# Patient Record
(demographics unavailable — no encounter records)

---

## 2024-12-03 NOTE — HISTORY OF PRESENT ILLNESS
[Previously active] : previously active [Men] : men [No] : No [FreeTextEntry1] : 12/03/2024. NAYLA MUSTAFA 22 year old female G0 , no menses while on Junel. She presents for an annual gyn exam.   She reports no menses with continuous use of Junel. She reports intermittent breakthrough spotting and pelvic cramping on OCP over the last geek. Last pelvic sono in 06/23 was normal. She used to take the placebo pills in the past but reports menses were very painful.   She denies abdominal and pelvic pain, no vaginal discharge or vaginitis symptoms. She reports normal urination, no dysuria, no incontinence of urine. BM is normal per patient, no blood in stool or constipation/diarrhea.  No new medical conditions, medications or surgeries.   Obhx: G0 GYNhx: Denies history of fibroids, abnl pap, STI, pelvic infection, breast issues PMH: anxiety PSH: appendectomy, wisdom teeth Med: Wellbutrin, Junel 1/20, Trazadone PRN All: Cefzil, Amoxicillin Famhx: PGM- breast ca, father- melanoma. Denies FHx of ovarian, uterine, colon, pancreatic, or prostate cancer. Soc Hx: rare alcohol, previous vape use, no drug or tobacco use, nonsmoker. S/p Gardasil. PHQ9 = 1  [TextBox_4] : TVUS 06/23- possible arcuate uterus, normal ovaries [PapSmeardate] : 06/23 [TextBox_31] : JL [GonorrheaDate] : 11/24 [TextBox_63] : not detected [ChlamydiaDate] : 11/24 [TextBox_68] : not detected

## 2024-12-03 NOTE — PLAN
[FreeTextEntry1] : Routine Gyn Exam: BSA, calcium, vitamin D and exercise d/w pt Pap smear conducted w/ reflex to HPV - cervix is friable Advised pt to see PCP and dermatologist annually Declines STI bloods  BTB on OCPs: Likely 2/2 continuous use Advised to have a bleed.  Advised pt take placebo pills Q 3-4 months to initiate a bleed Rx refilled for Junel 1/20  Dysmenorrhea: Discussed Naproxen prior to menses onset, r/b/a reviewed. Rx sent  Yellow Vaginal Discharge: Vaginitis panel sent  RTO in 1 year or PRN

## 2024-12-03 NOTE — PHYSICAL EXAM
[Chaperone Present] : A chaperone was present in the examining room during all aspects of the physical examination [60017] : A chaperone was present during the pelvic exam. [Appropriately responsive] : appropriately responsive [Alert] : alert [No Acute Distress] : no acute distress [No Lymphadenopathy] : no lymphadenopathy [Regular Rate Rhythm] : regular rate rhythm [No Murmurs] : no murmurs [Clear to Auscultation B/L] : clear to auscultation bilaterally [Soft] : soft [Non-tender] : non-tender [Non-distended] : non-distended [No HSM] : No HSM [No Lesions] : no lesions [No Mass] : no mass [Oriented x3] : oriented x3 [Examination Of The Breasts] : a normal appearance [No Masses] : no breast masses were palpable [Labia Majora] : normal [Labia Minora] : normal [Discharge] : a  ~M vaginal discharge was present [Scant] : scant [Uma] : yellow [Normal] : normal [Uterine Adnexae] : normal [FreeTextEntry2] : [facility]) [FreeTextEntry5] : cervix bleeds easily on pap

## 2024-12-10 NOTE — HISTORY OF PRESENT ILLNESS
[FreeTextEntry1] : 23 yo female on Junel 1/20 presents with heavy vaginal bleeding while mid- pack. Previously seen 1 week ago with though that BTB was due to skipping placebo pills. Plan was to induce a bleed by taking the placebo pills to allow for a full menses then resume pack as usual.   Pt also given option to increase dose to Junel 1.5/30 but pt declines due to concerns for weight gain, acne, and "being sensitive to hormones"  pt reports that she is not sexuall active

## 2024-12-10 NOTE — PLAN
[FreeTextEntry1] : 21 yo with break-through bleeding on OCPs - given option to increase dose to 1.5/30 vs a taper dose of current Junel 1/20 - pt opts for taper dose - plan 3 pills x 1 day, 2pills x 1 day, then resume 1 pill daily- skip placebo pills and start next pack and then take the placebo pills with the following pack - pt counselled that if this recurs, she just may require a higher dose of OCPs - will monitor  CONSTANTIN Mcdowell

## 2024-12-10 NOTE — PHYSICAL EXAM
[Labia Majora] : normal [Labia Minora] : normal [Normal] : normal [Uterine Adnexae] : normal [FreeTextEntry4] : moderate amt dark red blood in vault. no cervical lesions noted

## 2025-01-10 NOTE — ASSESSMENT
[FreeTextEntry1] : [facility] Labs reviewed with pt today Gyn utd Tdap utd 2021 Endocrine eval recommended Support offered f/u prn or 1 year

## 2025-01-10 NOTE — HEALTH RISK ASSESSMENT
[Good] : ~his/her~  mood as  good [Yes] : Yes [2 - 4 times a month (2 pts)] : 2-4 times a month (2 points) [No] : In the past 12 months have you used drugs other than those required for medical reasons? No [No falls in past year] : Patient reported no falls in the past year [Never] : Never [None] : None [With Family] : lives with family [College] : College [Single] : single [Feels Safe at Home] : Feels safe at home [Fully functional (bathing, dressing, toileting, transferring, walking, feeding)] : Fully functional (bathing, dressing, toileting, transferring, walking, feeding) [Fully functional (using the telephone, shopping, preparing meals, housekeeping, doing laundry, using] : Fully functional and needs no help or supervision to perform IADLs (using the telephone, shopping, preparing meals, housekeeping, doing laundry, using transportation, managing medications and managing finances) [Smoke Detector] : smoke detector [Carbon Monoxide Detector] : carbon monoxide detector [Seat Belt] :  uses seat belt [de-identified] : active [de-identified] : regular [Reports changes in hearing] : Reports no changes in hearing [Reports changes in vision] : Reports no changes in vision [Reports changes in dental health] : Reports no changes in dental health

## 2025-01-10 NOTE — HISTORY OF PRESENT ILLNESS
[FreeTextEntry1] : Annual Physical [de-identified] : NAYLA MUSTAFA is a 21 yo woman with anxiety here for a physical. She has been well overall.  Gyn utd. Tdap utd 2021.  Seeing psychiatrist for anxiety, stable on current med.  Some concern for PCOS, irregular periods, weight gain, advised endocrine eval.  The patient is single. She graduated from [employer], majoring in [employer]. She would have no difficulty walking 4 to 6 blocks.

## 2025-01-10 NOTE — REVIEW OF SYSTEMS
[Fever] : no fever [Vision Problems] : no vision problems [Nasal Discharge] : no nasal discharge [Chest Pain] : no chest pain [Abdominal Pain] : no abdominal pain

## 2025-01-10 NOTE — PHYSICAL EXAM
[No Acute Distress] : no acute distress [Well Nourished] : well nourished [Well Developed] : well developed [Well-Appearing] : well-appearing [Normal Sclera/Conjunctiva] : normal sclera/conjunctiva [EOMI] : extraocular movements intact [Normal Outer Ear/Nose] : the outer ears and nose were normal in appearance [Normal Oropharynx] : the oropharynx was normal [Normal TMs] : both tympanic membranes were normal [Normal Nasal Mucosa] : the nasal mucosa was normal [No Lymphadenopathy] : no lymphadenopathy [Supple] : supple [Thyroid Normal, No Nodules] : the thyroid was normal and there were no nodules present [No Respiratory Distress] : no respiratory distress  [No Accessory Muscle Use] : no accessory muscle use [Clear to Auscultation] : lungs were clear to auscultation bilaterally [Normal Percussion] : the chest was normal to percussion [Normal Rate] : normal rate  [Regular Rhythm] : with a regular rhythm [Normal S1, S2] : normal S1 and S2 [No Murmur] : no murmur heard [No Edema] : there was no peripheral edema [Soft] : abdomen soft [Non Tender] : non-tender [Non-distended] : non-distended [Normal Bowel Sounds] : normal bowel sounds [Normal Supraclavicular Nodes] : no supraclavicular lymphadenopathy [Normal Posterior Cervical Nodes] : no posterior cervical lymphadenopathy [Normal Anterior Cervical Nodes] : no anterior cervical lymphadenopathy [No CVA Tenderness] : no CVA  tenderness [Coordination Grossly Intact] : coordination grossly intact [No Focal Deficits] : no focal deficits [Normal Gait] : normal gait [Deep Tendon Reflexes (DTR)] : deep tendon reflexes were 2+ and symmetric [Speech Grossly Normal] : speech grossly normal [Memory Grossly Normal] : memory grossly normal [Normal Affect] : the affect was normal [Alert and Oriented x3] : oriented to person, place, and time [Normal Mood] : the mood was normal [Normal Insight/Judgement] : insight and judgment were intact [Normal Appearance] : normal in appearance [No Masses] : no palpable masses [No Nipple Discharge] : no nipple discharge [No Axillary Lymphadenopathy] : no axillary lymphadenopathy

## 2025-01-21 NOTE — ASSESSMENT
[FreeTextEntry1] : 22 y.o. female with h/o anxiety with h/o elevated TSH level, weight gain and irregular menses.  1. Elevated TSH- Discussed pathophysiology and possibility of autoimmune thyroid disease including Hashimoto's disease. Her thyroid levels were consistent with subclinical hypothyroidism. However, recent TFTs this month are normal. She had normal antithyroid antibodies in June 2022. Reviewed signs and symptoms of hypothyroidism. For now, will monitor.  2. Irregular menstrual cycle- Discussed pathophysiology and possible diagnosis of PCOS. She is currently on OCPs. Will check DHEAS and testosterone levels. Will check LH/FSH and estradiol though probably not helpful since she is taking OCPs. Will check fasting insulin level along with fasting serum glucose. Will check 17OHP to evaluate for nonclassic CAH. May consider starting Metformin ER. Reviewed risks and benefits of Metformin ER including GI side effects.  3. Weight gain- Will check midnight salivary cortisol level to evaluate for Cushings. Encouraged a carbohydrate consistent diet and exercise.

## 2025-01-21 NOTE — REVIEW OF SYSTEMS
[Fatigue] : fatigue [Recent Weight Gain (___ Lbs)] : recent weight gain: [unfilled] lbs [Myalgia] : myalgia  [Hair Loss] : hair loss [Headaches] : headaches [Anxiety] : anxiety [Heat Intolerance] : heat intolerance [Easy Bruising] : a tendency for easy bruising [Negative] : Respiratory [Acne] : acne [Hirsutism] : hirsutism [Polydipsia] : no polydipsia [FreeTextEntry7] : bowels are variable [FreeTextEntry8] : irregular menses with OCPs

## 2025-01-21 NOTE — PAST MEDICAL HISTORY
If you are a smoker, it is important for your health to stop smoking. Please be aware that second hand smoke is also harmful. [Menarche Age ____] : age at menarche was [unfilled] [Irregular Cycle Intervals] : are  irregular

## 2025-01-21 NOTE — HISTORY OF PRESENT ILLNESS
[FreeTextEntry1] : 22 y.o. 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   She denies prior history of thyroid disease. She does have a family history of thyroid disease which includes her mother with h/o Graves' disease and then hypothyroidism. Also her paternal grandmother has thyroid disease.   She reports increase in sweating overnight. Also reports weight gain of 50 pounds over 2 1/2 years now. C/o fatigue. Also report poor sleep but that is always an issue. She does report variable bowel movements sometimes. Reports hair loss. Reports increase hair in forehead area. Does reports acne but managing with dermatologist. Does exercise 5 times per week and walking.   Also restarted Zoloft 12.5 mg daily and also taking Wellbutrin for anxiety. Tried Lexapro in the past. She works with a therapist every 2 weeks and a psychiatrist every month or so.   No neck complaints including dysphagia, dysphonia or neck pain.   Lab work from 6/9/22 showed an elevated TSH of 5.26 and normal Free T4 of 1.3.   Reports cramping and breakthrough bleeding and saw GYN. Discussed possibility of endometriosis and PCOS. She saw 3rd GYN.  Taking Junel-Fe since 12th grade of high school.   She did have vaginal ultrasound, but no cystic ovaries and reports change in uterus shape.   She was recommended Metformin so back to our office for management.

## 2025-06-03 NOTE — REVIEW OF SYSTEMS
[Fatigue] : fatigue [Recent Weight Loss (___ Lbs)] : recent weight loss: [unfilled] lbs [Myalgia] : myalgia  [Acne] : acne [Hirsutism] : hirsutism [Hair Loss] : hair loss [Headaches] : headaches [Anxiety] : anxiety [Heat Intolerance] : heat intolerance [Easy Bruising] : a tendency for easy bruising [Negative] : Respiratory [Recent Weight Gain (___ Lbs)] : no recent weight gain [Polydipsia] : no polydipsia [FreeTextEntry7] : bowels are variable [FreeTextEntry8] : irregular menses with OCPs [de-identified] : sweating

## 2025-06-03 NOTE — REVIEW OF SYSTEMS
[Fatigue] : fatigue [Recent Weight Loss (___ Lbs)] : recent weight loss: [unfilled] lbs [Myalgia] : myalgia  [Acne] : acne [Hirsutism] : hirsutism [Hair Loss] : hair loss [Headaches] : headaches [Anxiety] : anxiety [Heat Intolerance] : heat intolerance [Easy Bruising] : a tendency for easy bruising [Negative] : Respiratory [Recent Weight Gain (___ Lbs)] : no recent weight gain [Polydipsia] : no polydipsia [FreeTextEntry7] : bowels are variable [FreeTextEntry8] : irregular menses with OCPs [de-identified] : sweating

## 2025-06-03 NOTE — REASON FOR VISIT
Learning About Vision Tests  What are vision tests?     The four most common vision tests are visual acuity tests, refraction, visual field tests, and color vision tests.  Visual acuity (sharpness) tests  These tests are used:  To see if you need glasses or contact lenses.  To monitor an eye problem.  To check an eye injury.  Visual acuity tests are done as part of routine exams. You may also have this test when you get your 's license or apply for some types of jobs.  Visual field tests  These tests are used:  To check for vision loss in any area of your range of vision.  To screen for certain eye diseases.  To look for nerve damage after a stroke, head injury, or other problem that could reduce blood flow to the brain.  Refraction and color tests  A refraction test is done to find the right prescription for glasses and contact lenses.  A color vision test is done to check for color blindness.  Color vision is often tested as part of a routine exam. You may also have this test when you apply for a job where recognizing different colors is important, such as , electronics, or the .  How are vision tests done?  Visual acuity test   You cover one eye at a time.  You read aloud from a wall chart across the room.  You read aloud from a small card that you hold in your hand.  Refraction   You look into a special device.  The device puts lenses of different strengths in front of each eye to see how strong your glasses or contact lenses need to be.  Visual field tests   Your doctor may have you look through special machines.  Or your doctor may simply have you stare straight ahead while they move a finger into and out of your field of vision.  Color vision test   You look at pieces of printed test patterns in various colors. You say what number or symbol you see.  Your doctor may have you trace the number or symbol using a pointer.  How do these tests feel?  There is very little chance of  [Follow - Up] : a follow-up visit [Hypothyroidism] : hypothyroidism [PCOS] : PCOS

## 2025-06-03 NOTE — ASSESSMENT
[FreeTextEntry1] : 23 y.o. female with h/o anxiety with h/o elevated TSH level, weight gain and irregular menses.  1. Elevated TSH- Discussed pathophysiology and possibility of autoimmune thyroid disease including Hashimoto's disease. Her thyroid levels were consistent with subclinical hypothyroidism. However, TFTs in January 2025 were normal. She had normal antithyroid antibodies in June 2022. Reviewed signs and symptoms of hypothyroidism. For now, will monitor.  2. Irregular menstrual cycle/PCOS- Discussed pathophysiology of PCOS. She is currently on OCPs. UTD with DHEAS, testosterone level, fasting insulin level along with fasting serum glucose. She had normal 17OHP to evaluate for nonclassic CAH. Will continue Metformin ER 1,500 mg daily. Reviewed risks and benefits of Metformin ER including GI side effects. We discussed GLP-1 RA and may consider if weight loss is still challenging.   3. Weight gain- Had normal midnight salivary cortisol level to evaluate for Cushings. Encouraged a carbohydrate consistent diet and exercise.   4. Hyperhidrosis- Will refer to dermatology.  Follow up in 4 to 6 months.

## 2025-06-03 NOTE — HISTORY OF PRESENT ILLNESS
[FreeTextEntry1] : 23 y.o. female with h/o anxiety disorder, PCOS and h/o elevated TSH level presents for follow up visit   She did see dermatology for hyperhidrosis. She reports worse on the face including scalp. She did try glycopyrrolate but stopped secondary to difficulty taking it and did not help much.   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   She denies prior history of thyroid disease. She does have a family history of thyroid disease which includes her mother with h/o Graves' disease and then hypothyroidism. Also her paternal grandmother has thyroid disease.   She reports increase in sweating overnight. Also reports weight gain of 50 pounds over 2 1/2 years now. C/o fatigue. Also report poor sleep but that is always an issue. She does report variable bowel movements sometimes. Reports hair loss. Reports increase hair in forehead area. Does reports acne but managing with dermatologist. Does exercise 3 to 4 times per week and walking.   Also taking Zoloft 25 mg daily and Wellbutrin for anxiety. Tried Lexapro in the past. She works with a therapist every 2 weeks and a psychiatrist every month or so.   No neck complaints including dysphagia, dysphonia or neck pain.   Lab work from 6/9/22 showed an elevated TSH of 5.26 and normal Free T4 of 1.3.   Reports cramping and breakthrough bleeding and saw GYN. Discussed possibility of endometriosis and PCOS. She saw 3rd GYN.  Taking Junel-Fe since 12th grade of high school.   She did have vaginal ultrasound, but no cystic ovaries and reports change in uterus shape.   She is taking Metformin ER 1,500 mg daily. Reports feeling less tired and sluggish. She has lost 15 pounds.   She is eating better and exercising 3 to 4 times per week. Does get steps in.

## 2025-06-03 NOTE — HISTORY OF PRESENT ILLNESS
[FreeTextEntry1] : 23 y.o. female with h/o anxiety disorder, PCOS and h/o elevated TSH level presents for follow up visit   She did see dermatology for hyperhidrosis. She reports worse on the face including scalp. She did try glycopyrrolate but stopped secondary to difficulty taking it and did not help much.   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   She denies prior history of thyroid disease. She does have a family history of thyroid disease which includes her mother with h/o Graves' disease and then hypothyroidism. Also her paternal grandmother has thyroid disease.   She reports increase in sweating overnight. Also reports weight gain of 50 pounds over 2 1/2 years now. C/o fatigue. Also report poor sleep but that is always an issue. She does report variable bowel movements sometimes. Reports hair loss. Reports increase hair in forehead area. Does reports acne but managing with dermatologist. Does exercise 3 to 4 times per week and walking.   Also taking Zoloft 25 mg daily and Wellbutrin for anxiety. Tried Lexapro in the past. She works with a therapist every 2 weeks and a psychiatrist every month or so.   No neck complaints including dysphagia, dysphonia or neck pain.   Lab work from 6/9/22 showed an elevated TSH of 5.26 and normal Free T4 of 1.3.   Reports cramping and breakthrough bleeding and saw GYN. Discussed possibility of endometriosis and PCOS. She saw 3rd GYN.  Taking Junel-Fe since 12th grade of high school.   She did have vaginal ultrasound, but no cystic ovaries and reports change in uterus shape.   She is taking Metformin ER 1,500 mg daily. Reports feeling less tired and sluggish. She has lost 15 pounds.   She is eating better and exercising 3 to 4 times per week. Does get steps in.